# Patient Record
Sex: FEMALE | URBAN - METROPOLITAN AREA
[De-identification: names, ages, dates, MRNs, and addresses within clinical notes are randomized per-mention and may not be internally consistent; named-entity substitution may affect disease eponyms.]

---

## 2023-08-10 ENCOUNTER — APPOINTMENT (RX ONLY)
Dept: URBAN - METROPOLITAN AREA CLINIC 184 | Facility: CLINIC | Age: 28
Setting detail: DERMATOLOGY
End: 2023-08-10

## 2023-08-10 DIAGNOSIS — D49.2 NEOPLASM OF UNSPECIFIED BEHAVIOR OF BONE, SOFT TISSUE, AND SKIN: ICD-10-CM

## 2023-08-10 DIAGNOSIS — D22 MELANOCYTIC NEVI: ICD-10-CM

## 2023-08-10 DIAGNOSIS — L57.8 OTHER SKIN CHANGES DUE TO CHRONIC EXPOSURE TO NONIONIZING RADIATION: ICD-10-CM

## 2023-08-10 PROBLEM — D22.62 MELANOCYTIC NEVI OF LEFT UPPER LIMB, INCLUDING SHOULDER: Status: ACTIVE | Noted: 2023-08-10

## 2023-08-10 PROBLEM — D23.9 OTHER BENIGN NEOPLASM OF SKIN, UNSPECIFIED: Status: ACTIVE | Noted: 2023-08-10

## 2023-08-10 PROBLEM — D22.5 MELANOCYTIC NEVI OF TRUNK: Status: ACTIVE | Noted: 2023-08-10

## 2023-08-10 PROCEDURE — ? OBSERVATION AND MEASURE

## 2023-08-10 PROCEDURE — ? DIAGNOSIS COMMENT

## 2023-08-10 PROCEDURE — 99203 OFFICE O/P NEW LOW 30 MIN: CPT | Mod: 25

## 2023-08-10 PROCEDURE — ? BIOPSY BY SHAVE METHOD

## 2023-08-10 PROCEDURE — ? COUNSELING

## 2023-08-10 PROCEDURE — 11102 TANGNTL BX SKIN SINGLE LES: CPT

## 2023-08-10 PROCEDURE — ? SUNSCREEN RECOMMENDATIONS

## 2023-08-10 ASSESSMENT — LOCATION SIMPLE DESCRIPTION DERM
LOCATION SIMPLE: RIGHT BREAST
LOCATION SIMPLE: ABDOMEN
LOCATION SIMPLE: RIGHT BREAST
LOCATION SIMPLE: RIGHT UPPER BACK
LOCATION SIMPLE: LEFT HAND
LOCATION SIMPLE: LEFT LOWER BACK
LOCATION SIMPLE: LEFT UPPER BACK
LOCATION SIMPLE: LEFT UPPER ARM

## 2023-08-10 ASSESSMENT — LOCATION DETAILED DESCRIPTION DERM
LOCATION DETAILED: LEFT INFERIOR LATERAL MIDBACK
LOCATION DETAILED: LEFT RADIAL DORSAL HAND
LOCATION DETAILED: RIGHT SUPERIOR UPPER BACK
LOCATION DETAILED: RIGHT LATERAL BREAST 7-8:00 REGION
LOCATION DETAILED: LEFT ANTERIOR PROXIMAL UPPER ARM
LOCATION DETAILED: RIGHT RIB CAGE
LOCATION DETAILED: SUBXIPHOID
LOCATION DETAILED: LEFT SUPERIOR UPPER BACK
LOCATION DETAILED: RIGHT LATERAL BREAST 6-7:00 REGION

## 2023-08-10 ASSESSMENT — LOCATION ZONE DERM
LOCATION ZONE: TRUNK
LOCATION ZONE: ARM
LOCATION ZONE: TRUNK
LOCATION ZONE: HAND

## 2023-08-10 NOTE — PROCEDURE: OBSERVATION
Detail Level: Detailed
Size Of Lesion: 3mm
Morphology Per Location (Optional): Light brown
Size Of Lesion: 4mm
Morphology Per Location (Optional): Light brown
Size Of Lesion: 6mm
Morphology Per Location (Optional): Light brown
Size Of Lesion: 7mm
Morphology Per Location (Optional): Light brown

## 2023-08-10 NOTE — PROCEDURE: DIAGNOSIS COMMENT
Render Risk Assessment In Note?: no
Detail Level: Simple
Comment: LM 3.5 cm to aerola
Comment: FBSE; all clear today, biopsy was taken of R breast. Patient will follow up in a year

## 2023-08-10 NOTE — PROCEDURE: BIOPSY BY SHAVE METHOD
Detail Level: Detailed
Depth Of Biopsy: dermis
Was A Bandage Applied: Yes
Size Of Lesion In Cm: 0.9
X Size Of Lesion In Cm: 0
Biopsy Type: H and E
Biopsy Method: double edge Personna blade
Anesthesia Type: 1% lidocaine with epinephrine
Anesthesia Volume In Cc (Will Not Render If 0): 0.5
Hemostasis: Drysol
Wound Care: Petrolatum
Dressing: bandage
Destruction After The Procedure: No
Type Of Destruction Used: Curettage
Curettage Text: The wound bed was treated with curettage after the biopsy was performed.
Cryotherapy Text: The wound bed was treated with cryotherapy after the biopsy was performed.
Electrodesiccation Text: The wound bed was treated with electrodesiccation after the biopsy was performed.
Electrodesiccation And Curettage Text: The wound bed was treated with electrodesiccation and curettage after the biopsy was performed.
Silver Nitrate Text: The wound bed was treated with silver nitrate after the biopsy was performed.
Lab: [id number]
Consent: Written consent was obtained and risks were reviewed including but not limited to scarring, infection, bleeding, scabbing, incomplete removal, nerve damage and allergy to anesthesia.
Post-Care Instructions: I reviewed with the patient in detail post-care instructions. Patient is to keep the biopsy site dry overnight, and then apply bacitracin twice daily until healed. Patient may apply hydrogen peroxide soaks to remove any crusting.
Notification Instructions: Patient will be notified of biopsy results. However, patient instructed to call the office if not contacted within 2 weeks.
Billing Type: Third-Party Bill
Information: Selecting Yes will display possible errors in your note based on the variables you have selected. This validation is only offered as a suggestion for you. PLEASE NOTE THAT THE VALIDATION TEXT WILL BE REMOVED WHEN YOU FINALIZE YOUR NOTE. IF YOU WANT TO FAX A PRELIMINARY NOTE YOU WILL NEED TO TOGGLE THIS TO 'NO' IF YOU DO NOT WANT IT IN YOUR FAXED NOTE.

## 2023-08-10 NOTE — PROCEDURE: MIPS QUALITY
Quality 226: Preventive Care And Screening: Tobacco Use: Screening And Cessation Intervention: Patient screened for tobacco use and is an ex/non-smoker
Quality 402: Tobacco Use And Help With Quitting Among Adolescents: Patient screened for tobacco and never smoked
Quality 130: Documentation Of Current Medications In The Medical Record: Current Medications Documented
Quality 431: Preventive Care And Screening: Unhealthy Alcohol Use - Screening: Patient not identified as an unhealthy alcohol user when screened for unhealthy alcohol use using a systematic screening method
Quality 110: Preventive Care And Screening: Influenza Immunization: Influenza Immunization Administered during Influenza season
Detail Level: Detailed

## 2024-02-08 ENCOUNTER — APPOINTMENT (RX ONLY)
Dept: URBAN - METROPOLITAN AREA CLINIC 184 | Facility: CLINIC | Age: 29
Setting detail: DERMATOLOGY
End: 2024-02-08

## 2024-02-08 DIAGNOSIS — L57.8 OTHER SKIN CHANGES DUE TO CHRONIC EXPOSURE TO NONIONIZING RADIATION: ICD-10-CM

## 2024-02-08 DIAGNOSIS — D22 MELANOCYTIC NEVI: ICD-10-CM

## 2024-02-08 DIAGNOSIS — D49.2 NEOPLASM OF UNSPECIFIED BEHAVIOR OF BONE, SOFT TISSUE, AND SKIN: ICD-10-CM

## 2024-02-08 PROBLEM — D22.5 MELANOCYTIC NEVI OF TRUNK: Status: ACTIVE | Noted: 2024-02-08

## 2024-02-08 PROBLEM — D22.72 MELANOCYTIC NEVI OF LEFT LOWER LIMB, INCLUDING HIP: Status: ACTIVE | Noted: 2024-02-08

## 2024-02-08 PROBLEM — D22.71 MELANOCYTIC NEVI OF RIGHT LOWER LIMB, INCLUDING HIP: Status: ACTIVE | Noted: 2024-02-08

## 2024-02-08 PROBLEM — D22.4 MELANOCYTIC NEVI OF SCALP AND NECK: Status: ACTIVE | Noted: 2024-02-08

## 2024-02-08 PROBLEM — D22.62 MELANOCYTIC NEVI OF LEFT UPPER LIMB, INCLUDING SHOULDER: Status: ACTIVE | Noted: 2024-02-08

## 2024-02-08 PROBLEM — D22.39 MELANOCYTIC NEVI OF OTHER PARTS OF FACE: Status: ACTIVE | Noted: 2024-02-08

## 2024-02-08 PROCEDURE — ? PHOTO-DOCUMENTATION

## 2024-02-08 PROCEDURE — ? OBSERVATION AND MEASURE

## 2024-02-08 PROCEDURE — 11102 TANGNTL BX SKIN SINGLE LES: CPT

## 2024-02-08 PROCEDURE — ? COUNSELING

## 2024-02-08 PROCEDURE — ? SUNSCREEN RECOMMENDATIONS

## 2024-02-08 PROCEDURE — ? BIOPSY BY SHAVE METHOD

## 2024-02-08 PROCEDURE — ? DIAGNOSIS COMMENT

## 2024-02-08 PROCEDURE — 99213 OFFICE O/P EST LOW 20 MIN: CPT | Mod: 25

## 2024-02-08 ASSESSMENT — LOCATION DETAILED DESCRIPTION DERM
LOCATION DETAILED: LEFT DISTAL POSTERIOR THIGH
LOCATION DETAILED: RIGHT INFERIOR LATERAL MALAR CHEEK
LOCATION DETAILED: RIGHT RIB CAGE
LOCATION DETAILED: LEFT LATERAL ABDOMEN
LOCATION DETAILED: SUBXIPHOID
LOCATION DETAILED: INFERIOR MID FOREHEAD
LOCATION DETAILED: LEFT SUPERIOR UPPER BACK
LOCATION DETAILED: LEFT ANTERIOR PROXIMAL UPPER ARM
LOCATION DETAILED: RIGHT PLANTAR FOREFOOT OVERLYING 3RD METATARSAL
LOCATION DETAILED: LEFT INFERIOR LATERAL MIDBACK
LOCATION DETAILED: LEFT MID-UPPER BACK
LOCATION DETAILED: EPIGASTRIC SKIN
LOCATION DETAILED: LEFT SUPERIOR LATERAL MIDBACK
LOCATION DETAILED: RIGHT SUPERIOR UPPER BACK
LOCATION DETAILED: RIGHT POSTAURICULAR SKIN

## 2024-02-08 ASSESSMENT — LOCATION ZONE DERM
LOCATION ZONE: FEET
LOCATION ZONE: SCALP
LOCATION ZONE: ARM
LOCATION ZONE: LEG
LOCATION ZONE: TRUNK
LOCATION ZONE: FACE

## 2024-02-08 ASSESSMENT — LOCATION SIMPLE DESCRIPTION DERM
LOCATION SIMPLE: POSTERIOR SCALP
LOCATION SIMPLE: INFERIOR FOREHEAD
LOCATION SIMPLE: RIGHT CHEEK
LOCATION SIMPLE: LEFT UPPER BACK
LOCATION SIMPLE: RIGHT UPPER BACK
LOCATION SIMPLE: RIGHT PLANTAR SURFACE
LOCATION SIMPLE: LEFT LOWER BACK
LOCATION SIMPLE: ABDOMEN
LOCATION SIMPLE: LEFT UPPER ARM
LOCATION SIMPLE: LEFT POSTERIOR THIGH

## 2024-02-08 NOTE — HPI: OTHER
Condition:: FBSE
Please Describe Your Condition:: Spots of concern on Back. Family Hx of skin cancer (Mother, father: BCC).

## 2024-02-08 NOTE — PROCEDURE: OBSERVATION
Detail Level: Detailed
Size Of Lesion: 3mm
Morphology Per Location (Optional): Light brown
Size Of Lesion: 4mm
Size Of Lesion: 6mm
Size Of Lesion: 7mm
Size Of Lesion: 2mm
Size Of Lesion: 5mm
Size Of Lesion: 1mm
Morphology Per Location (Optional): Medium brown macule